# Patient Record
Sex: FEMALE | Employment: UNEMPLOYED | URBAN - METROPOLITAN AREA
[De-identification: names, ages, dates, MRNs, and addresses within clinical notes are randomized per-mention and may not be internally consistent; named-entity substitution may affect disease eponyms.]

---

## 2018-06-20 LAB — HIV 1+2 AB+HIV1 P24 AG SERPL QL IA: NON REACTIVE

## 2018-06-21 LAB
HBV SURFACE AG SERPL QL IA: NON REACTIVE
RUBELLA ABY IGG: NORMAL

## 2019-01-10 LAB — GROUP B STREP PCR: POSITIVE

## 2019-01-30 ENCOUNTER — HOSPITAL ENCOUNTER (OUTPATIENT)
Facility: CLINIC | Age: 34
Discharge: HOME | End: 2019-01-30
Attending: OBSTETRICS & GYNECOLOGY | Admitting: OBSTETRICS & GYNECOLOGY
Payer: COMMERCIAL

## 2019-01-30 VITALS
TEMPERATURE: 99.8 F | SYSTOLIC BLOOD PRESSURE: 111 MMHG | HEART RATE: 120 BPM | DIASTOLIC BLOOD PRESSURE: 75 MMHG | RESPIRATION RATE: 18 BRPM

## 2019-01-30 PROCEDURE — G0463 HOSPITAL OUTPT CLINIC VISIT: HCPCS | Mod: 25

## 2019-01-30 PROCEDURE — 59025 FETAL NON-STRESS TEST: CPT

## 2019-01-30 RX ORDER — ONDANSETRON 2 MG/ML
4 INJECTION INTRAMUSCULAR; INTRAVENOUS EVERY 6 HOURS PRN
Status: DISCONTINUED | OUTPATIENT
Start: 2019-01-30 | End: 2019-01-30 | Stop reason: HOSPADM

## 2019-01-30 RX ORDER — PRENATAL VIT/IRON FUM/FOLIC AC 27MG-0.8MG
1 TABLET ORAL DAILY
COMMUNITY

## 2019-01-30 SDOH — HEALTH STABILITY: MENTAL HEALTH: HOW OFTEN DO YOU HAVE A DRINK CONTAINING ALCOHOL?: NEVER

## 2019-01-31 NOTE — PLAN OF CARE
Data: Patient presented to [facility]: 2019  7:59 PM.  Reason for maternal/fetal assessment is uterine contractions. Patient reports I'm having Conts every 5-8 min aapart.  Patient is a .  Prenatal record reviewed. Pregnancy has been uncomplicated..  Gestational Age 38w6d. VSS. Fetal movement present. Patient denies leaking of vaginal fluid/rupture of membranes, vaginal bleeding, abdominal pain, pelvic pressure, nausea, vomiting, headache, visual disturbances, epigastric or URQ pain, significant edema. Support person is present.   Action: Verbal consent for EFM. Triage assessment completed. Bill of rights reviewed.  Response: Patient verbalized agreement with plan. Will contact Dr Merlyn Maher with update and further orders.

## 2019-01-31 NOTE — PROVIDER NOTIFICATION
01/30/19 2056   Provider Notification   Provider Name/Title DR Nika LARA   Method of Notification Phone   Request Evaluate - Remote   Notification Reason Patient Arrived;Uterine Activity;Patient Request;Status Update;SVE   Paged MD update re pt arrival,OB hx,VS temp 99.8, Pt denies any sign and symptoms of being sick or UTI.Conts are sporadic now; pt reported less intense compare at home,SVE done same as in clinic.FHT Category 1.Options discussed with pt to walk for an hour and re-evaluate.Pt requested to go home and said she will come back again once her conts are more stronger.MD gave discharge orders.POC d/w pt and spouse.They voiced understandings.

## 2019-01-31 NOTE — PLAN OF CARE
Data: Patient assessed in the [facility] for uterine contractions.  Cervical exam mid position, dilated to 1, thick, effaced 30-50% and soft.  Membranes intact.  Contractions sporadic.  Action:  Presumed adequate fetal oxygenation documented (see flow record). Discharge instructions reviewed.  Patient instructed to report change in fetal movement, vaginal leaking of fluid or bleeding, abdominal pain, or any concerns related to the pregnancy to her nurse/physician.    Response: Orders to discharge home per Merlyn Maher.  Patient verbalized understanding of education and verbalized agreement with plan. Discharged to home at 2110.

## 2019-01-31 NOTE — DISCHARGE INSTRUCTIONS
Discharge Instruction for Undelivered Patients      You were seen for: Labor Assessment and Fetal Assessment  We Consulted: DR Maher  You had (Test or Medicine):EFM NST,and SVE.     Diet:   Drink 8 to 12 glasses of liquids (milk, juice, water) every day.  You may eat meals and snacks.     Activity:  Call your doctor or nurse midwife if your baby is moving less than usual.     Call your provider if you notice:  Swelling in your face or increased swelling in your hands or legs.  Headaches that are not relieved by Tylenol (acetaminophen).  Changes in your vision (blurring: seeing spots or stars.)  Nausea (sick to your stomach) and vomiting (throwing up).   Weight gain of 5 pounds or more per week.  Heartburn that doesn't go away.  Signs of bladder infection: pain when you urinate (use the toilet), need to go more often and more urgently.  The bag of dean (rupture of membranes) breaks, or you notice leaking in your underwear.  Bright red blood in your underwear.  Abdominal (lower belly) or stomach pain.  Second (plus) baby: Contractions (tightening) gets more stronger.  Increase or change in vaginal discharge (note the color and amount)  Other: none    Follow-up:  As scheduled in the clinic

## 2019-02-01 ENCOUNTER — ANESTHESIA (OUTPATIENT)
Dept: OBGYN | Facility: CLINIC | Age: 34
End: 2019-02-01

## 2019-02-01 ENCOUNTER — HOSPITAL ENCOUNTER (INPATIENT)
Facility: CLINIC | Age: 34
LOS: 2 days | Discharge: HOME | End: 2019-02-03
Attending: OBSTETRICS & GYNECOLOGY | Admitting: OBSTETRICS & GYNECOLOGY
Payer: COMMERCIAL

## 2019-02-01 ENCOUNTER — ANESTHESIA EVENT (OUTPATIENT)
Dept: OBGYN | Facility: CLINIC | Age: 34
End: 2019-02-01

## 2019-02-01 PROBLEM — B95.1 POSITIVE GBS TEST: Status: ACTIVE | Noted: 2019-02-01

## 2019-02-01 PROBLEM — Z36.89 ENCOUNTER FOR TRIAGE IN PREGNANT PATIENT: Status: ACTIVE | Noted: 2019-02-01

## 2019-02-01 PROBLEM — O42.90 AMNIOTIC FLUID LEAKING: Status: ACTIVE | Noted: 2019-02-01

## 2019-02-01 PROBLEM — Z34.80 SUPERVISION OF OTHER NORMAL PREGNANCY, ANTEPARTUM: Status: ACTIVE | Noted: 2019-02-01

## 2019-02-01 LAB
ABO + RH BLD: NORMAL
ABO + RH BLD: NORMAL
ALBUMIN UR-MCNC: 30 MG/DL
APPEARANCE UR: ABNORMAL
BACTERIA #/AREA URNS HPF: ABNORMAL /HPF
BILIRUB UR QL STRIP: NEGATIVE
BLD GP AB SCN SERPL QL: NORMAL
BLOOD BANK CMNT PATIENT-IMP: NORMAL
COLOR UR AUTO: YELLOW
GLUCOSE UR STRIP-MCNC: NEGATIVE MG/DL
HGB BLD-MCNC: 11.3 G/DL (ref 11.7–15.7)
HGB UR QL STRIP: ABNORMAL
KETONES UR STRIP-MCNC: NEGATIVE MG/DL
LEUKOCYTE ESTERASE UR QL STRIP: ABNORMAL
MUCOUS THREADS #/AREA URNS LPF: PRESENT /LPF
NITRATE UR QL: NEGATIVE
PH UR STRIP: 7 PH (ref 5–7)
RBC #/AREA URNS AUTO: 32 /HPF (ref 0–2)
RUPTURE OF FETAL MEMBRANES BY ROM PLUS: POSITIVE
SOURCE: ABNORMAL
SP GR UR STRIP: 1.01 (ref 1–1.03)
SPECIMEN EXP DATE BLD: NORMAL
SQUAMOUS #/AREA URNS AUTO: 7 /HPF (ref 0–1)
T PALLIDUM AB SER QL: NONREACTIVE
UROBILINOGEN UR STRIP-MCNC: 0 MG/DL (ref 0–2)
WBC #/AREA URNS AUTO: >182 /HPF (ref 0–5)
WBC CLUMPS #/AREA URNS HPF: PRESENT /HPF

## 2019-02-01 PROCEDURE — 72200001 ZZH LABOR CARE VAGINAL DELIVERY SINGLE

## 2019-02-01 PROCEDURE — 81001 URINALYSIS AUTO W/SCOPE: CPT | Performed by: OBSTETRICS & GYNECOLOGY

## 2019-02-01 PROCEDURE — 25000132 ZZH RX MED GY IP 250 OP 250 PS 637: Performed by: OBSTETRICS & GYNECOLOGY

## 2019-02-01 PROCEDURE — 87086 URINE CULTURE/COLONY COUNT: CPT | Performed by: OBSTETRICS & GYNECOLOGY

## 2019-02-01 PROCEDURE — 25000128 H RX IP 250 OP 636: Performed by: OBSTETRICS & GYNECOLOGY

## 2019-02-01 PROCEDURE — 85018 HEMOGLOBIN: CPT | Performed by: OBSTETRICS & GYNECOLOGY

## 2019-02-01 PROCEDURE — 86901 BLOOD TYPING SEROLOGIC RH(D): CPT | Performed by: OBSTETRICS & GYNECOLOGY

## 2019-02-01 PROCEDURE — 36415 COLL VENOUS BLD VENIPUNCTURE: CPT | Performed by: OBSTETRICS & GYNECOLOGY

## 2019-02-01 PROCEDURE — G0463 HOSPITAL OUTPT CLINIC VISIT: HCPCS

## 2019-02-01 PROCEDURE — 86850 RBC ANTIBODY SCREEN: CPT | Performed by: OBSTETRICS & GYNECOLOGY

## 2019-02-01 PROCEDURE — 25000125 ZZHC RX 250: Performed by: OBSTETRICS & GYNECOLOGY

## 2019-02-01 PROCEDURE — 86780 TREPONEMA PALLIDUM: CPT | Performed by: OBSTETRICS & GYNECOLOGY

## 2019-02-01 PROCEDURE — 84112 EVAL AMNIOTIC FLUID PROTEIN: CPT | Performed by: OBSTETRICS & GYNECOLOGY

## 2019-02-01 PROCEDURE — 12000000 ZZH R&B MED SURG/OB

## 2019-02-01 PROCEDURE — 0KQM0ZZ REPAIR PERINEUM MUSCLE, OPEN APPROACH: ICD-10-PCS | Performed by: OBSTETRICS & GYNECOLOGY

## 2019-02-01 PROCEDURE — 86900 BLOOD TYPING SEROLOGIC ABO: CPT | Performed by: OBSTETRICS & GYNECOLOGY

## 2019-02-01 RX ORDER — AMOXICILLIN 250 MG
2 CAPSULE ORAL 2 TIMES DAILY
Status: DISCONTINUED | OUTPATIENT
Start: 2019-02-01 | End: 2019-02-03 | Stop reason: HOSPADM

## 2019-02-01 RX ORDER — IBUPROFEN 800 MG/1
800 TABLET, FILM COATED ORAL EVERY 6 HOURS PRN
Status: DISCONTINUED | OUTPATIENT
Start: 2019-02-01 | End: 2019-02-03 | Stop reason: HOSPADM

## 2019-02-01 RX ORDER — OXYTOCIN 10 [USP'U]/ML
10 INJECTION, SOLUTION INTRAMUSCULAR; INTRAVENOUS
Status: DISCONTINUED | OUTPATIENT
Start: 2019-02-01 | End: 2019-02-03 | Stop reason: HOSPADM

## 2019-02-01 RX ORDER — MISOPROSTOL 200 UG/1
400 TABLET ORAL ONCE
Status: DISCONTINUED | OUTPATIENT
Start: 2019-02-01 | End: 2019-02-01

## 2019-02-01 RX ORDER — HYDROCORTISONE 2.5 %
CREAM (GRAM) TOPICAL 3 TIMES DAILY PRN
Status: DISCONTINUED | OUTPATIENT
Start: 2019-02-01 | End: 2019-02-03 | Stop reason: HOSPADM

## 2019-02-01 RX ORDER — OXYCODONE AND ACETAMINOPHEN 5; 325 MG/1; MG/1
1 TABLET ORAL
Status: COMPLETED | OUTPATIENT
Start: 2019-02-01 | End: 2019-02-01

## 2019-02-01 RX ORDER — OXYTOCIN/0.9 % SODIUM CHLORIDE 30/500 ML
100 PLASTIC BAG, INJECTION (ML) INTRAVENOUS CONTINUOUS
Status: DISCONTINUED | OUTPATIENT
Start: 2019-02-01 | End: 2019-02-03 | Stop reason: HOSPADM

## 2019-02-01 RX ORDER — OXYCODONE AND ACETAMINOPHEN 5; 325 MG/1; MG/1
1 TABLET ORAL EVERY 4 HOURS PRN
Status: DISCONTINUED | OUTPATIENT
Start: 2019-02-01 | End: 2019-02-03 | Stop reason: HOSPADM

## 2019-02-01 RX ORDER — ACETAMINOPHEN 325 MG/1
650 TABLET ORAL EVERY 4 HOURS PRN
Status: DISCONTINUED | OUTPATIENT
Start: 2019-02-01 | End: 2019-02-03 | Stop reason: HOSPADM

## 2019-02-01 RX ORDER — OXYTOCIN/0.9 % SODIUM CHLORIDE 30/500 ML
340 PLASTIC BAG, INJECTION (ML) INTRAVENOUS CONTINUOUS PRN
Status: DISCONTINUED | OUTPATIENT
Start: 2019-02-01 | End: 2019-02-03 | Stop reason: HOSPADM

## 2019-02-01 RX ORDER — CARBOPROST TROMETHAMINE 250 UG/ML
250 INJECTION, SOLUTION INTRAMUSCULAR
Status: DISCONTINUED | OUTPATIENT
Start: 2019-02-01 | End: 2019-02-01

## 2019-02-01 RX ORDER — METHYLERGONOVINE MALEATE 0.2 MG/1
200 TABLET ORAL EVERY 6 HOURS SCHEDULED
Status: COMPLETED | OUTPATIENT
Start: 2019-02-01 | End: 2019-02-02

## 2019-02-01 RX ORDER — NALOXONE HYDROCHLORIDE 0.4 MG/ML
.1-.4 INJECTION, SOLUTION INTRAMUSCULAR; INTRAVENOUS; SUBCUTANEOUS
Status: DISCONTINUED | OUTPATIENT
Start: 2019-02-01 | End: 2019-02-03 | Stop reason: HOSPADM

## 2019-02-01 RX ORDER — SODIUM CHLORIDE, SODIUM LACTATE, POTASSIUM CHLORIDE, CALCIUM CHLORIDE 600; 310; 30; 20 MG/100ML; MG/100ML; MG/100ML; MG/100ML
INJECTION, SOLUTION INTRAVENOUS CONTINUOUS
Status: DISCONTINUED | OUTPATIENT
Start: 2019-02-01 | End: 2019-02-01

## 2019-02-01 RX ORDER — PENICILLIN G POTASSIUM 5000000 [IU]/1
5 INJECTION, POWDER, FOR SOLUTION INTRAMUSCULAR; INTRAVENOUS ONCE
Status: COMPLETED | OUTPATIENT
Start: 2019-02-01 | End: 2019-02-01

## 2019-02-01 RX ORDER — METHYLERGONOVINE MALEATE 0.2 MG/ML
200 INJECTION INTRAVENOUS
Status: DISCONTINUED | OUTPATIENT
Start: 2019-02-01 | End: 2019-02-01

## 2019-02-01 RX ORDER — CEPHALEXIN 500 MG/1
500 CAPSULE ORAL EVERY 12 HOURS SCHEDULED
Status: DISCONTINUED | OUTPATIENT
Start: 2019-02-01 | End: 2019-02-03

## 2019-02-01 RX ORDER — FENTANYL CITRATE 50 UG/ML
50-100 INJECTION, SOLUTION INTRAMUSCULAR; INTRAVENOUS
Status: DISCONTINUED | OUTPATIENT
Start: 2019-02-01 | End: 2019-02-01

## 2019-02-01 RX ORDER — NALOXONE HYDROCHLORIDE 0.4 MG/ML
.1-.4 INJECTION, SOLUTION INTRAMUSCULAR; INTRAVENOUS; SUBCUTANEOUS
Status: DISCONTINUED | OUTPATIENT
Start: 2019-02-01 | End: 2019-02-01

## 2019-02-01 RX ORDER — LANOLIN 100 %
OINTMENT (GRAM) TOPICAL
Status: DISCONTINUED | OUTPATIENT
Start: 2019-02-01 | End: 2019-02-03 | Stop reason: HOSPADM

## 2019-02-01 RX ORDER — OXYTOCIN/0.9 % SODIUM CHLORIDE 30/500 ML
100-340 PLASTIC BAG, INJECTION (ML) INTRAVENOUS CONTINUOUS PRN
Status: COMPLETED | OUTPATIENT
Start: 2019-02-01 | End: 2019-02-01

## 2019-02-01 RX ORDER — ACETAMINOPHEN 325 MG/1
650 TABLET ORAL EVERY 4 HOURS PRN
Status: DISCONTINUED | OUTPATIENT
Start: 2019-02-01 | End: 2019-02-01

## 2019-02-01 RX ORDER — ONDANSETRON 2 MG/ML
4 INJECTION INTRAMUSCULAR; INTRAVENOUS EVERY 6 HOURS PRN
Status: DISCONTINUED | OUTPATIENT
Start: 2019-02-01 | End: 2019-02-01

## 2019-02-01 RX ORDER — AMOXICILLIN 250 MG
1 CAPSULE ORAL 2 TIMES DAILY
Status: DISCONTINUED | OUTPATIENT
Start: 2019-02-01 | End: 2019-02-03 | Stop reason: HOSPADM

## 2019-02-01 RX ORDER — IBUPROFEN 800 MG/1
800 TABLET, FILM COATED ORAL
Status: COMPLETED | OUTPATIENT
Start: 2019-02-01 | End: 2019-02-01

## 2019-02-01 RX ORDER — OXYTOCIN 10 [USP'U]/ML
10 INJECTION, SOLUTION INTRAMUSCULAR; INTRAVENOUS
Status: DISCONTINUED | OUTPATIENT
Start: 2019-02-01 | End: 2019-02-01

## 2019-02-01 RX ORDER — BISACODYL 10 MG
10 SUPPOSITORY, RECTAL RECTAL DAILY PRN
Status: DISCONTINUED | OUTPATIENT
Start: 2019-02-03 | End: 2019-02-03 | Stop reason: HOSPADM

## 2019-02-01 RX ADMIN — IBUPROFEN 800 MG: 800 TABLET ORAL at 20:25

## 2019-02-01 RX ADMIN — CEPHALEXIN 500 MG: 500 CAPSULE ORAL at 20:25

## 2019-02-01 RX ADMIN — SENNOSIDES AND DOCUSATE SODIUM 1 TABLET: 8.6; 5 TABLET ORAL at 20:25

## 2019-02-01 RX ADMIN — SODIUM CHLORIDE, POTASSIUM CHLORIDE, SODIUM LACTATE AND CALCIUM CHLORIDE: 600; 310; 30; 20 INJECTION, SOLUTION INTRAVENOUS at 03:05

## 2019-02-01 RX ADMIN — LIDOCAINE HYDROCHLORIDE 20 ML: 10 INJECTION, SOLUTION EPIDURAL; INFILTRATION; INTRACAUDAL; PERINEURAL at 05:20

## 2019-02-01 RX ADMIN — OXYTOCIN-SODIUM CHLORIDE 0.9% IV SOLN 30 UNIT/500ML 340 ML/HR: 30-0.9/5 SOLUTION at 05:20

## 2019-02-01 RX ADMIN — ACETAMINOPHEN 650 MG: 325 TABLET, FILM COATED ORAL at 16:27

## 2019-02-01 RX ADMIN — IBUPROFEN 800 MG: 800 TABLET ORAL at 14:00

## 2019-02-01 RX ADMIN — IBUPROFEN 800 MG: 800 TABLET, FILM COATED ORAL at 05:40

## 2019-02-01 RX ADMIN — CEPHALEXIN 500 MG: 500 CAPSULE ORAL at 08:43

## 2019-02-01 RX ADMIN — SENNOSIDES AND DOCUSATE SODIUM 1 TABLET: 8.6; 5 TABLET ORAL at 08:43

## 2019-02-01 RX ADMIN — PENICILLIN G POTASSIUM 5 MILLION UNITS: 5000000 POWDER, FOR SOLUTION INTRAMUSCULAR; INTRAPLEURAL; INTRATHECAL; INTRAVENOUS at 03:08

## 2019-02-01 RX ADMIN — METHYLERGONOVINE MALEATE 200 MCG: 0.2 TABLET ORAL at 17:56

## 2019-02-01 RX ADMIN — METHYLERGONOVINE MALEATE 200 MCG: 0.2 TABLET ORAL at 11:28

## 2019-02-01 RX ADMIN — OXYCODONE HYDROCHLORIDE AND ACETAMINOPHEN 1 TABLET: 5; 325 TABLET ORAL at 05:40

## 2019-02-01 NOTE — H&P
[facility]    History and Physical  Obstetrics and Gynecology     Date of Admission:  2019    CHIEF COMPLAINT:  Leaking amniotic fluid    HISTORY OF PRESENT ILLNESS:    (Please see scanned  sheets for prenatal history. Examination at the time of admission revealed no interval change in the patient s history or physical exam except as described below.)    Pt is a 32 yo  at 39 weeks 1 day who presents with spontaneous rupture of membranes.  This pregnancy has been uncomplicated however she is GBS+.  She established care at 7 weeks 1 day.  She declined genetic testing.  She had a normal 20 week scan.  Her 1 hr GTT was normal at 95.  She developed mild anemia and was started on iron.  She is GBS positive.  She is healthy.    OBSTETRICAL / DATING HISTORY:  No LMP recorded. Patient is pregnant.  Estimated Date of Delivery: 2019  Gestational Age: 39w1d    Obstetric History         Obstetric History       T2      L2     SAB0   TAB0   Ectopic0   Multiple0   Live Births2       # Outcome Date GA Lbr Richard/2nd Weight Sex Delivery Anes PTL Lv   3 Current            2 Term 01/08/15 39w6d  3.68 kg (8 lb 1.8 oz) M Vag-Spont None N MARILEE   1 Term 13 38w4d  3.062 kg (6 lb 12 oz) M Vag-Spont None N MARILEE            LABS:  Recent Labs   Lab Test 19  0350   ABO O   RH Pos   AS Neg     Rhogam not indicated   Recent Labs   Lab Test 01/10/19 06/21/18 06/20/18   HEPBANG  --  Non reactive  --    HIAGAB  --   --  Non reactive   GBS Positive  --   --          PAST MEDICAL HISTORY:  Past Medical History:   Diagnosis Date     Anemia in pregnancy      Migraine        PAST SURGICAL HISTORY:  Past Surgical History:   Procedure Laterality Date     NO HISTORY OF SURGERY         FAMILY HISTORY:  Family History   Problem Relation Age of Onset     No Known Problems Mother      No Known Problems Father      No Known Problems Sister      No Known Problems Brother          SOCIAL  HISTORY:  Social History     Socioeconomic History     Marital status:      Spouse name: None     Number of children: None     Years of education: None     Highest education level: None   Social Needs     Financial resource strain: None     Food insecurity - worry: None     Food insecurity - inability: None     Transportation needs - medical: None     Transportation needs - non-medical: None   Occupational History     Occupation: Homemaker   Tobacco Use     Smoking status: Never Smoker     Smokeless tobacco: Never Used   Substance and Sexual Activity     Alcohol use: No     Frequency: Never     Drug use: No     Sexual activity: Yes     Partners: Male   Other Topics Concern     None   Social History Narrative     None       MEDICATIONS:  Prenatal vitamins  Iron    ALLERGIES:   No Known Allergies               REVIEW OF SYSTEMS:  ROS - leaking of fluid, good fetal movement, painful contractions      PHYSICAL EXAM:   /61   Temp 97.8  F (36.6  C) (Oral)   Resp 16   Wt 68.9 kg (152 lb)   General appearance: Pt is alert and oriented x 3.    Abdomen: gravid, single vertex fetus, non-tender, EFW 7 lbs 6 oz  Cervical Exam: 5/ 80/ Anterior/ soft/ -1 per RN last exam    Fetal Heart Tones: 130 baseline, moderate variablility, + accels, no decels and Category I  TOCO:   frequency q 2-3 minutes    ASSESSMENT:  33 year old  at 39w1d - SROM in labor    PLAN:  Admit to Family [facility]  FHT category I  GBS positive - antibiotics ordered  Pain management options discussed  Routine labor care      Angela Carey MD  Ob/Gyn

## 2019-02-01 NOTE — L&D DELIVERY NOTE
OB Vaginal Delivery Note    Rosy Carson MRN# [id number]   Age: 33 year old YOB: 1985       GA: 39w1d  GP:   Labor Complications: None;GBS   EBL: 400  mL  QBL: 400 mL  Delivery Type: Vaginal, Spontaneous   ROM to Delivery Time: 4h 08m   Weight: 3.38 kg (7 lb 7.2 oz)    1 Minute 5 Minute 10 Minute   Apgar Totals: 8    9                Delivery Details:  Rosy Carson, a 33 year old  female at 39 weeks 1 day who presented to labor and delivery with spontaneous rupture of membranes and labor.  IV antibiotics were given due to GBS + status.  She progressed normally in labor and delivered a viable male infant at 05:08 with apgars of 8   and 9  . Nuchal x 1 was easily reduced.  The baby was placed on the maternal abdomen following delivery where the umbilical cord was clamped and cut.  The placenta delivered spontaneously intact with a 3 vessel cord.  A second degree perineal laceration was noted.  This was injected with lidocaine and repaired with 3-0 vicryl in the standard fashion.   She tolerated the delivery and repair well.      Cord complications: nuchal   Placenta delivered at 2019  5:12 AM . Placental disposition was Hospital disposal . Fundal massage performed and fundus found to be firm.     Episiotomy: none    Perineum, vagina, cervix were inspected, and the following lacerations were noted:   Perineal lacerations: 2nd                     Any lacerations were repaired in the usual fashion using 3-0 vicryl.    Excellent hemostasis was noted. Needle count correct. Infant and patient in delivery room in good and stable condition.        Labor Event Times    Labor onset date:  19 Onset time:   1:00 AM   Dilation complete date:  19 Complete time:   5:03 AM   Start pushing date/time:  2019 0505      Labor Events    Labor Type:  Spontaneous  Predominate monitoring during 1st stage:  continuous electronic fetal monitoring     Antibiotics received during labor?:   "Yes  Reason for Antibiotics:  GBS  Antibiotics received for GBS:  Penicillin     Rupture date/time: 19 0100   Rupture type:  Spontaneous rupture of membranes occuring during spontaneous labor or augmentation     1:1 continuous labor support provided by?:  RN       Delivery/Placenta Date and Time    Delivery Date:  19 Delivery Time:   5:08 AM   Placenta Date/Time:  2019  5:12 AM  Oxytocin given at the time of delivery:  after delivery of baby     Vaginal Counts     Initial count performed by 2 team members:   Two Team Members   Dr. Aurelio GIFFORD RN       [employer]   Initial counts 2  5    Added to count  1     Final counts 2 1 5    Placed during labor Accounted for at the end of labor   No NA   No NA   No NA    Final count performed by 2 team members:   Two Team Members   Dr. Aurelio GIFFORD RN      Final count correct?:  Yes     Apgars    Living status:  Living   1 Minute 5 Minute 10 Minute 15 Minute 20 Minute   Skin color: 0  1       Heart rate: 2  2       Reflex irritability: 2  2       Muscle tone: 2  2       Respiratory effort: 2  2       Total: 8  9       Apgars assigned by:  JENNIFER MARTINEZ     Cord    Vessels:  3 Vessels Complications:  Nuchal   Cord Blood Disposition:  Lab Gases Sent?:  Yes      [place] Resuscitation    Methods:  None      Measurements    Weight:  7 lb 7.2 oz Length:  1' 9\"   Head circumference:  13.5 cm       Skin to Skin and Feeding Plan    Skin to skin initiation date/time: 1841    Skin to skin with:  Mother  Skin to skin end date/time:     How do you plan to feed your baby:  Breastfeeding, Formula     Labor Events and Shoulder Dystocia    Fetal Tracing Prior to Delivery:  Category 1  Shoulder dystocia present?:  Neg     Delivery (Maternal) (Provider to Complete) ([id number])    Episiotomy:  None  Perineal lacerations:  2nd    Vaginal laceration?:  No    Cervical laceration?:  No    Est. blood loss (mL):  400     Blood Loss  Mother: " Rosy Carson #[id number]   Start of Mother's Information    IO Blood Loss  02/01/19 0100 - 02/01/19 0540    EBL (mL) Hospital Encounter 400 mL    Total QBL Blood Loss (mL) Hospital Encounter 400 mL    Total  800 mL         End of Mother's Information  Mother: Rosy Carson #[id number]         Delivery - Provider to Complete ([id number])    Delivering clinician:  Angela Carey MD  Attempted Delivery Types (Choose all that apply):  Spontaneous Vaginal Delivery  Delivery Type (Choose the 1 that will go to the Birth History):  Vaginal, Spontaneous          Placenta    Delayed Cord Clamping:  Done  Date/Time:  2/1/2019  5:12 AM  Removal:  Spontaneous  Comments:  Nuchal x 1  Disposition:  Hospital disposal     Anesthesia    Method:  None          Presentation and Position    Presentation:  Vertex  Position:  Right Occiput Anterior           Angela Carey MD

## 2019-02-01 NOTE — PLAN OF CARE
Dr. Guevara saw patient at 1020 to evaluate bleeding and examine for clots. 1 small clot found by internal exam by MD, orders for PO methergine received with discharge of cytotec. Fundal exam at 1120 firm with scant flow with scant amount of blood on pad.

## 2019-02-01 NOTE — PLAN OF CARE
Data: Vital signs within normal limits. Postpartum checks within normal limits - see flow record. Patient eating and drinking normally. Patient able to empty bladder independently and is up ambulating. No apparent signs of infection. Laceration  healing well. Patient performing self cares and is able to care for infant.  Action: Patient medicated during the shift for pain. See MAR. Patient reassessed within 1 hour after each medication and pain was improved - patient stated she was comfortable. Patient education done about medications, bleeding, breast/bottle feeding and orientated to room and  clipboard. See flow record.  Response: Positive attachment behaviors observed with infant. Support persons is present.   Plan: Anticipate discharge on 19.

## 2019-02-01 NOTE — PROVIDER NOTIFICATION
02/01/19 1000   Provider Notification   Provider Name/Title Dr. Guevara   Method of Notification Phone   Request Evaluate-Remote   Notification Reason Other   Dr. Guevara called and updated regarding increased flow, absence of clots, and firm uterus without massage. New order's for PO cytotec received. Will update MD with bleeding.

## 2019-02-01 NOTE — PROVIDER NOTIFICATION
02/01/19 0435   Provider Notification   Provider Name/Title Dr. Carey   Method of Notification Phone   Request Evaluate - Remote   Notification Reason SVE;Uterine Activity   Patient feeling pressure in bottom. 5/85/-1. Bulging forebag noted. Dr. Carey enroute to [facility].

## 2019-02-01 NOTE — PROGRESS NOTES
Called by nurse to see pt with increased lochia rubra, more than usual or expected.  Pt doing well, nursing.  Passed small clot x 1 when she got up.    Had delivery at 0508 this morning, placenta described as intact, .  Adm hgb 11.3.    Blood pressure 111/72, temperature 97.8  F (36.6  C), temperature source Oral, resp. rate 16, weight 68.9 kg (152 lb).    WDWN [place] woman, nursing   FF at U or U-1, parker to right, very firm.  Perineal laceration repair intact.  Gentle bimanual exam without significant clot, cervix feels intact by palpation.  I did not get a lot of flow/clot out with exam.  Her paeripad is soaked--will weigh.    ASSESSMENT:  Excessive pp lochia so far.    PLAN:  Will add methergine x 3 doses, continue to monitor.  Repair looks intact and hemostatic.  Fundus is firm.    Merlyn Maher MD on 2019 at 10:37 AM

## 2019-02-01 NOTE — PLAN OF CARE
"Data: Patient presented to [facility]: 2019  1:28 AM.  Reason for maternal/fetal assessment is leaking vaginal fluid. Patient reports \"milky white\" gush of fluid at 0100 with contractions every 10 minutes.  Patient is a .  Prenatal record reviewed. Pregnancy  has been complicated by has been uncomplicated.  Gestational Age 39w1d. VSS. Fetal movement present. Patient denies vaginal bleeding, abdominal pain, and nausea/vomiting . Support person is present.   Action: Verbal consent for EFM. Triage assessment completed. Bill of rights reviewed.  Response: Patient verbalized agreement with plan. Will contact Dr Angela Carey with update and further orders.      "

## 2019-02-01 NOTE — PROVIDER NOTIFICATION
19 0225   Provider Notification   Provider Name/Title Dr. Carey   Method of Notification Phone   Request Evaluate - Remote   Notification Reason SVE;Membrane Status;Uterine Activity;Patient Arrived    here for r/o rupture of membranes. States water broke at 0100. Rom+ positive. SVE 3/50/-2. Rachel every 6 minutes, palpate mild. GBS +. Intrapartum orders received. Will  Update MD  after 4 hours of initial penicillin treatment. Patient is not planning epidural. POC discussed with patient and support person.

## 2019-02-02 LAB
BACTERIA SPEC CULT: NORMAL
BACTERIA SPEC CULT: NORMAL
HGB BLD-MCNC: 10.4 G/DL (ref 11.7–15.7)
Lab: NORMAL
SPECIMEN SOURCE: NORMAL

## 2019-02-02 PROCEDURE — 85018 HEMOGLOBIN: CPT | Performed by: OBSTETRICS & GYNECOLOGY

## 2019-02-02 PROCEDURE — 36415 COLL VENOUS BLD VENIPUNCTURE: CPT | Performed by: OBSTETRICS & GYNECOLOGY

## 2019-02-02 PROCEDURE — 25000132 ZZH RX MED GY IP 250 OP 250 PS 637: Performed by: OBSTETRICS & GYNECOLOGY

## 2019-02-02 PROCEDURE — 12000000 ZZH R&B MED SURG/OB

## 2019-02-02 PROCEDURE — 40000083 ZZH STATISTIC IP LACTATION SERVICES 1-15 MIN

## 2019-02-02 RX ORDER — IBUPROFEN 800 MG/1
800 TABLET, FILM COATED ORAL EVERY 6 HOURS PRN
Qty: 40 TABLET | Refills: 1 | Status: SHIPPED | OUTPATIENT
Start: 2019-02-02 | End: 2019-02-03

## 2019-02-02 RX ORDER — CEPHALEXIN 500 MG/1
500 CAPSULE ORAL EVERY 12 HOURS
Qty: 20 CAPSULE | Refills: 0 | Status: SHIPPED | OUTPATIENT
Start: 2019-02-02 | End: 2019-02-03

## 2019-02-02 RX ORDER — AMOXICILLIN 250 MG
1 CAPSULE ORAL 2 TIMES DAILY PRN
Qty: 20 TABLET | Refills: 1 | Status: SHIPPED | OUTPATIENT
Start: 2019-02-02 | End: 2019-02-03

## 2019-02-02 RX ADMIN — CEPHALEXIN 500 MG: 500 CAPSULE ORAL at 20:07

## 2019-02-02 RX ADMIN — SENNOSIDES AND DOCUSATE SODIUM 1 TABLET: 8.6; 5 TABLET ORAL at 08:59

## 2019-02-02 RX ADMIN — METHYLERGONOVINE MALEATE 200 MCG: 0.2 TABLET ORAL at 06:03

## 2019-02-02 RX ADMIN — IBUPROFEN 800 MG: 800 TABLET ORAL at 20:07

## 2019-02-02 RX ADMIN — METHYLERGONOVINE MALEATE 200 MCG: 0.2 TABLET ORAL at 00:22

## 2019-02-02 RX ADMIN — CEPHALEXIN 500 MG: 500 CAPSULE ORAL at 08:59

## 2019-02-02 RX ADMIN — IBUPROFEN 800 MG: 800 TABLET ORAL at 13:55

## 2019-02-02 RX ADMIN — IBUPROFEN 800 MG: 800 TABLET ORAL at 02:54

## 2019-02-02 NOTE — PLAN OF CARE
Pt doing well this shift. VSS. Bonding well with . Independent with breastfeeding. Pain is well controlled with Tylenol and Ibuprofen. Uterus firm without massage, in midline, bleeding stable. Pt is voiding without difficulty and ambulating independently. Independent in self cares. Meeting expected goals. Continue to monitor.

## 2019-02-02 NOTE — PROGRESS NOTES
[facility]   Post-partum Note    Name:  Rosy Carson  MRN: [id number]    S: Patient states is doing well  This Am.  Pain is adequately controlled.  Tolerating regular diet without nausea or vomiting. Voiding spontaneously, passing flatus and had a bowel movement as of yet.   Ambulating without dizziness today.  Lochia is similar to menses.  Breast and bottle feeding. Patient is undecided about  Contraception,states no one ever talked to her  About it before.  Plans discharge tomorrow.    O:   Patient Vitals for the past 24 hrs:   BP Temp Temp src Pulse Resp   19 0024 101/51 98.3  F (36.8  C) Oral 86 16   19 1627 113/69 99  F (37.2  C) Oral 83 18   19 1348 115/69 -- -- 85 18   19 1125 109/69 -- -- 81 --   19 1010 111/71 98.2  F (36.8  C) Oral 79 16     Gen:  Resting comfortably, NAD  CV:  Regular rate  Pulm:  Non-labored breathing.  No cough or wheezing.   Abd:  Soft, appropriately tender to palpation, non-distended.  Fundus at -1 umbilicus, firm and non-tender.  Ext:  Non-tender, trace LE edema b/l    Assessment/Plan:  33 year old  on PPD #1 s/p .  Continue with routine postpartum management.     Urinary urgency, presumed  UTI:   - UCx pending, per Lab should be back tomorrow  - PPX started on Keflex  - Afebrile     Pain: Well-controlled with ibuprofen and tylenol  Hgb: 11.3>>AM Hgb pending.  Discharge with oral iron if <10.0.  VSS as noted above, asymptomatic. Patient did receive 24 hours of methergine for increased lochia flow.   GI:  BID Senna/Colace.  PRN Simethicone.   PPx:  Encouraged ambulation   Rh: Positive  Rubella: Immune  Feed: Breast and bottle  BC: Undecided, declines information   Dispo: Plan for home on PPD#2.     Michelle Pena MD   Pager: [phone]   2019

## 2019-02-02 NOTE — PLAN OF CARE
Pt. VSS. Pain managed with PRN ibuprofen. Independent in infant and personal cares. Breastfeeding infant. Attentive to infant needs and bonding well with infant.

## 2019-02-02 NOTE — PLAN OF CARE
Patient is stable, actively working on breastfeeding, gaining strength and independence with self and baby cares.

## 2019-02-03 VITALS
HEART RATE: 87 BPM | TEMPERATURE: 98.2 F | DIASTOLIC BLOOD PRESSURE: 67 MMHG | RESPIRATION RATE: 18 BRPM | WEIGHT: 152 LBS | SYSTOLIC BLOOD PRESSURE: 109 MMHG

## 2019-02-03 PROCEDURE — 25000132 ZZH RX MED GY IP 250 OP 250 PS 637: Performed by: OBSTETRICS & GYNECOLOGY

## 2019-02-03 RX ORDER — IBUPROFEN 600 MG/1
600 TABLET, FILM COATED ORAL EVERY 6 HOURS PRN
Qty: 90 TABLET | Refills: 1 | Status: SHIPPED | OUTPATIENT
Start: 2019-02-03 | End: 2019-02-03

## 2019-02-03 RX ORDER — AMOXICILLIN 250 MG
1 CAPSULE ORAL 2 TIMES DAILY PRN
Qty: 60 TABLET | Refills: 1 | Status: SHIPPED | OUTPATIENT
Start: 2019-02-03 | End: 2019-03-05

## 2019-02-03 RX ORDER — IBUPROFEN 800 MG/1
800 TABLET, FILM COATED ORAL EVERY 6 HOURS PRN
COMMUNITY
Start: 2019-02-03

## 2019-02-03 RX ADMIN — SENNOSIDES AND DOCUSATE SODIUM 1 TABLET: 8.6; 5 TABLET ORAL at 09:10

## 2019-02-03 RX ADMIN — IBUPROFEN 800 MG: 800 TABLET ORAL at 09:10

## 2019-02-03 NOTE — PROGRESS NOTES
PPD #2+    No complaints, doing well.  Ready for discharge today. Urine culture is negative, so no longer needs keflex.  Does not think she has excess bleeding.     OBJECTIVE:  Blood pressure 116/72, pulse 78, temperature 98.6  F (37  C), temperature source Oral, resp. rate 16, weight 68.9 kg (152 lb), unknown if currently breastfeeding.    WDWN woman in NAD.  FF U -1    Hemoglobin   Date Value Ref Range Status   2019 10.4 (L) 11.7 - 15.7 g/dL Final   2019 11.3 (L) 11.7 - 15.7 g/dL Final       ASSESSMENT:  S/P  doing well  PPD #2  Principal Problem:    Amniotic fluid leaking (2019)  Active Problems:    Encounter for triage in pregnant patient (2019)    Indication for care in labor or delivery (2019)    Supervision of other normal pregnancy, antepartum (2019)    Positive GBS test (2019)      PLAN:  OK to discharge home today.  Post partum restrictions and what to expect in puerperium reviewed. Rx Ibuprofen, colace.  RTC 6 weeks for post partum cares, pelvic rest until then.    Merlyn Maher MD February 3, 2019

## 2019-02-03 NOTE — LACTATION NOTE
Lactation in to see patient. Patient states she breast fed her other babies well, and felt like it only took a day for her milk to come in. This baby she says is hungry and is choosing to breastfeed and then supplement with formula via bottle afterwards. Discussed trying to breastfeed first for stimulation. Encouraged patient to call for assistance prn.

## 2019-02-03 NOTE — PLAN OF CARE
Pt stable and meeting expected goals. VSS. Up ad herman and independent with cares. Breastfeeding is going well; baby was in the nursery for half the shift and formula fed. Bonding with mother. Continue to monitor.

## 2019-02-03 NOTE — PLAN OF CARE
VSS. Pain is well controlled with Ibuprofen. Pt has been independent with self and  cares. Voiding without difficulty. Tolerating regular diet well. Bonding well with . Anticipate discharge on 2/3/2019.

## 2019-02-03 NOTE — PLAN OF CARE
Patient meeting expected goals. Is up independent in room, meeting all personal and infant needs. VSS. Pain is being managed with Ibuprofen. Breastfeeding is going well. Bonding well with infant. Patient is stable and will be ready for discharge later today. Patient is aware to follow up in 6 weeks for postpartum visit. Discharge meds issued to patient; Ibuprofen and Senna. MD discontinued Keflex today.

## 2019-02-03 NOTE — PROGRESS NOTES
Writer reviewed all discharge paperwork with both patient and spouse and all questions answered. Patient left unit with all belongings and discharge meds and infant at 1250.

## 2019-02-03 NOTE — DISCHARGE INSTRUCTIONS
"Rosy-    Congratulations on your new baby!    A few instructions:    -No tampons/douching/intercourse x 6 weeks.  See your health care provider first, to be sure you are healed.  -If you have stitches, it may help you feel better sooner ifyou can sit in the tub twice daily for a few minutes, for the first week after delivery.  Keep the area clean and dry.  -Call your clinic if fever, worsening pain, or any questions.    Medications:  Ibuprofen 800 mg every 6 hours will be helpful for pain and cramping.  You may want to use a stool softener if needed, available over the counter, like Colace 100 mg twice daily or Senna twice daily.    Post partum depression:   10-15% of women will have more than the baby blues--if you are feeling very sad or down, having regrets, can't stop thinking about certain thoughts or actions, you could be experiencing post partum depression.  Please call your health care provider to discuss.    Follow up:  Please call your clinic soon to schedule a \"post partum visit\" for 6 weeks from now.   If you need anything by prescription for contraception, they will go over it with you at that visit.    Again, congratulations!       Merlyn Maher MD February 3, 2019     Postpartum Vaginal Delivery Instructions    Activity       Ask family and friends for help when you need it.    Do not place anything in your vagina for 6 weeks.    You are not restricted on other activities, but take it easy for a few weeks to allow your body to recover from delivery.  You are able to do any activities you feel up to that point.    No driving until you have stopped taking your pain medications (usually two weeks after delivery).     Call your health care provider if you have any of these symptoms:       Increased pain, swelling, redness, or fluid around your stiches from an episiotomy or perineal tear.    A fever above 100.4 F (38 C) with or without chills when placing a thermometer under your tongue.    You soak " a sanitary pad with blood within 1 hour, or you see blood clots larger than a golf ball.    Bleeding that lasts more than 6 weeks.    Vaginal discharge that smells bad.    Severe pain, cramping or tenderness in your lower belly area.    A need to urinate more frequently (use the toilet more often), more urgently (use the toilet very quickly), or it burns when you urinate.    Nausea and vomiting.    Redness, swelling or pain around a vein in your leg.    Problems breastfeeding or a red or painful area on your breast.    Chest pain and cough or are gasping for air.    Problems coping with sadness, anxiety, or depression.  If you have any concerns about hurting yourself or the baby, call your provider immediately.     You have questions or concerns after you return home.     Keep your hands clean:  Always wash your hands before touching your perineal area and stitches.  This helps reduce your risk of infection.  If your hands aren't dirty, you may use an alcohol hand-rub to clean your hands. Keep your nails clean and short.

## 2022-12-30 ENCOUNTER — HOSPITAL ENCOUNTER (EMERGENCY)
Facility: CLINIC | Age: 37
Discharge: HOME | End: 2022-12-30
Attending: EMERGENCY MEDICINE | Admitting: EMERGENCY MEDICINE
Payer: COMMERCIAL

## 2022-12-30 VITALS
DIASTOLIC BLOOD PRESSURE: 76 MMHG | OXYGEN SATURATION: 100 % | SYSTOLIC BLOOD PRESSURE: 112 MMHG | RESPIRATION RATE: 18 BRPM | TEMPERATURE: 97.9 F | WEIGHT: 140 LBS | HEART RATE: 75 BPM

## 2022-12-30 DIAGNOSIS — S01.81XA FACIAL LACERATION, INITIAL ENCOUNTER: ICD-10-CM

## 2022-12-30 DIAGNOSIS — S00.83XA FACIAL CONTUSION, INITIAL ENCOUNTER: ICD-10-CM

## 2022-12-30 DIAGNOSIS — D64.9 ANEMIA, UNSPECIFIED TYPE: ICD-10-CM

## 2022-12-30 DIAGNOSIS — R55 SYNCOPE, UNSPECIFIED SYNCOPE TYPE: ICD-10-CM

## 2022-12-30 DIAGNOSIS — R10.13 ABDOMINAL PAIN, EPIGASTRIC: ICD-10-CM

## 2022-12-30 LAB
ALBUMIN SERPL BCG-MCNC: 4.3 G/DL (ref 3.5–5.2)
ALP SERPL-CCNC: 84 U/L (ref 35–104)
ALT SERPL W P-5'-P-CCNC: 9 U/L (ref 10–35)
ANION GAP SERPL CALCULATED.3IONS-SCNC: 12 MMOL/L (ref 7–15)
AST SERPL W P-5'-P-CCNC: 16 U/L (ref 10–35)
BASOPHILS # BLD AUTO: 0 10E3/UL (ref 0–0.2)
BASOPHILS NFR BLD AUTO: 1 %
BILIRUB SERPL-MCNC: 0.2 MG/DL
BUN SERPL-MCNC: 13.4 MG/DL (ref 6–20)
CALCIUM SERPL-MCNC: 9.1 MG/DL (ref 8.6–10)
CHLORIDE SERPL-SCNC: 104 MMOL/L (ref 98–107)
CREAT SERPL-MCNC: 0.47 MG/DL (ref 0.51–0.95)
DEPRECATED HCO3 PLAS-SCNC: 23 MMOL/L (ref 22–29)
EOSINOPHIL # BLD AUTO: 0.1 10E3/UL (ref 0–0.7)
EOSINOPHIL NFR BLD AUTO: 2 %
ERYTHROCYTE [DISTWIDTH] IN BLOOD BY AUTOMATED COUNT: 17.4 % (ref 10–15)
GFR SERPL CREATININE-BSD FRML MDRD: >90 ML/MIN/1.73M2
GLUCOSE SERPL-MCNC: 95 MG/DL (ref 70–99)
HCG SERPL QL: NEGATIVE
HCT VFR BLD AUTO: 35.8 % (ref 35–47)
HGB BLD-MCNC: 10.9 G/DL (ref 11.7–15.7)
IMM GRANULOCYTES # BLD: 0 10E3/UL
IMM GRANULOCYTES NFR BLD: 0 %
LIPASE SERPL-CCNC: 25 U/L (ref 13–60)
LYMPHOCYTES # BLD AUTO: 1.4 10E3/UL (ref 0.8–5.3)
LYMPHOCYTES NFR BLD AUTO: 29 %
MCH RBC QN AUTO: 23.2 PG (ref 26.5–33)
MCHC RBC AUTO-ENTMCNC: 30.4 G/DL (ref 31.5–36.5)
MCV RBC AUTO: 76 FL (ref 78–100)
MONOCYTES # BLD AUTO: 0.4 10E3/UL (ref 0–1.3)
MONOCYTES NFR BLD AUTO: 8 %
NEUTROPHILS # BLD AUTO: 3 10E3/UL (ref 1.6–8.3)
NEUTROPHILS NFR BLD AUTO: 60 %
NRBC # BLD AUTO: 0 10E3/UL
NRBC BLD AUTO-RTO: 0 /100
PLATELET # BLD AUTO: 298 10E3/UL (ref 150–450)
POTASSIUM SERPL-SCNC: 3.6 MMOL/L (ref 3.4–5.3)
PROT SERPL-MCNC: 7.9 G/DL (ref 6.4–8.3)
RBC # BLD AUTO: 4.69 10E6/UL (ref 3.8–5.2)
SODIUM SERPL-SCNC: 139 MMOL/L (ref 136–145)
TROPONIN T SERPL HS-MCNC: <6 NG/L
WBC # BLD AUTO: 5 10E3/UL (ref 4–11)

## 2022-12-30 PROCEDURE — 93005 ELECTROCARDIOGRAM TRACING: CPT

## 2022-12-30 PROCEDURE — 258N000003 HC RX IP 258 OP 636: Performed by: EMERGENCY MEDICINE

## 2022-12-30 PROCEDURE — 84484 ASSAY OF TROPONIN QUANT: CPT | Performed by: EMERGENCY MEDICINE

## 2022-12-30 PROCEDURE — 83690 ASSAY OF LIPASE: CPT | Performed by: EMERGENCY MEDICINE

## 2022-12-30 PROCEDURE — 99284 EMERGENCY DEPT VISIT MOD MDM: CPT

## 2022-12-30 PROCEDURE — 250N000013 HC RX MED GY IP 250 OP 250 PS 637: Performed by: EMERGENCY MEDICINE

## 2022-12-30 PROCEDURE — 96360 HYDRATION IV INFUSION INIT: CPT

## 2022-12-30 PROCEDURE — 85025 COMPLETE CBC W/AUTO DIFF WBC: CPT | Performed by: EMERGENCY MEDICINE

## 2022-12-30 PROCEDURE — 36415 COLL VENOUS BLD VENIPUNCTURE: CPT | Performed by: EMERGENCY MEDICINE

## 2022-12-30 PROCEDURE — 80053 COMPREHEN METABOLIC PANEL: CPT | Performed by: EMERGENCY MEDICINE

## 2022-12-30 PROCEDURE — 84703 CHORIONIC GONADOTROPIN ASSAY: CPT | Performed by: EMERGENCY MEDICINE

## 2022-12-30 RX ORDER — SODIUM CHLORIDE 9 MG/ML
INJECTION, SOLUTION INTRAVENOUS CONTINUOUS
Status: DISCONTINUED | OUTPATIENT
Start: 2022-12-30 | End: 2022-12-30 | Stop reason: HOSPADM

## 2022-12-30 RX ORDER — MAGNESIUM HYDROXIDE/ALUMINUM HYDROXICE/SIMETHICONE 120; 1200; 1200 MG/30ML; MG/30ML; MG/30ML
30 SUSPENSION ORAL ONCE
Status: COMPLETED | OUTPATIENT
Start: 2022-12-30 | End: 2022-12-30

## 2022-12-30 RX ORDER — FAMOTIDINE 20 MG/1
20 TABLET, FILM COATED ORAL 2 TIMES DAILY
Qty: 30 TABLET | Refills: 0 | Status: SHIPPED | OUTPATIENT
Start: 2022-12-30

## 2022-12-30 RX ADMIN — ALUMINUM HYDROXIDE, MAGNESIUM HYDROXIDE, AND SIMETHICONE 30 ML: 200; 200; 20 SUSPENSION ORAL at 10:26

## 2022-12-30 RX ADMIN — SODIUM CHLORIDE 1000 ML: 9 INJECTION, SOLUTION INTRAVENOUS at 10:27

## 2022-12-30 ASSESSMENT — ENCOUNTER SYMPTOMS
LIGHT-HEADEDNESS: 1
NAUSEA: 1
WOUND: 1
DIFFICULTY URINATING: 0
SHORTNESS OF BREATH: 0
PALPITATIONS: 0
VOMITING: 0

## 2022-12-30 ASSESSMENT — ACTIVITIES OF DAILY LIVING (ADL): ADLS_ACUITY_SCORE: 35

## 2022-12-30 NOTE — ED TRIAGE NOTES
Pt arrives after a fainting episode at home. Was standing in kitchen near stove and everything went black. Patient fell the floor. Per  once fell to floor woke up quickly. Pt hit cheek on stove. Small cut to right side of cheek. Bleeding controlled. Pt now complains of nausea, epigastric pain and dizziness.      Triage Assessment     Row Name 12/30/22 7974       Triage Assessment (Adult)    Airway WDL WDL       Respiratory WDL    Respiratory WDL WDL       Skin Circulation/Temperature WDL    Skin Circulation/Temperature WDL WDL       Cardiac WDL    Cardiac WDL WDL       Peripheral/Neurovascular WDL    Peripheral Neurovascular WDL WDL       Cognitive/Neuro/Behavioral WDL    Cognitive/Neuro/Behavioral WDL X  dizziness

## 2022-12-30 NOTE — DISCHARGE INSTRUCTIONS
Use maalox and/or pepcid as needed for pain if helps (pain may be due to gastritis, gerd, or ulcer). Consider daily prilosec after discussion with your primary care provider. Avoid ibuprofen/NDAIDs, alcohol, spicy foods.    Discharge Instructions  Syncope    Syncope (fainting) is a sudden, short loss of consciousness (passing out spell). People will usually fall to the ground when they faint or slump over if seated.  People may also shake when this happens, and it can sometimes be difficult to tell the difference between syncope and a seizure. At this time, your provider does not find a reason to suspect that your fainting spell is a sign of anything dangerous or life-threatening.  However, sometimes the signs of serious illness do not show up right away.     Generally, every Emergency Department visit should have a follow-up clinic visit with either a primary or a specialty clinic/provider. Please follow-up as instructed by your emergency provider today.    Return to the Emergency Department if:  You faint again.   You have any significant bleeding.  You have chest pain or a fast or irregular heartbeat.  You feel short of breath.  You cough up any blood.  You have abdominal (belly) pain or unusual back pain.  You have ongoing vomiting (throwing up) or diarrhea (loose stools).  You have a black or tarry bowel movement, or blood in the stool or in your vomit.  You have a fever over 101 F.  You lose feeling or cannot move a part of your body or cannot talk normally.  You are confused, have a headache, cannot see well, or have a seizure.  DO NOT DRIVE. CALL 911 INSTEAD!    What can I do to help myself?  Follow any specific instructions that your provider discussed with you.  If you feel light-headed, make sure to sit down right away, even if you have to sit on the floor.  Follow up with your regular medical provider as discussed for further management. This may include lowering your blood pressure medications, insulin  or other diabetic medications, checking your blood sugar more frequently, and drinking more fluids, taking medicines for vomiting or diarrhea or getting up slower.  If you were given a prescription for medicine here today, be sure to read all of the information (including the package insert) that comes with your prescription.  This will include important information about the medicine, its side effects, and any warnings that you need to know about.  The pharmacist who fills the prescription can provide more information and answer questions you may have about the medicine.  If you have questions or concerns that the pharmacist cannot address, please call or return to the Emergency Department.   Remember that you can always come back to the Emergency Department if you are not able to see your regular provider in the amount of time listed above, if you get any new symptoms, or if there is anything that worries you.     Discharge Instructions  Head Injury    You have been seen today for a head injury. Your evaluation included a history and physical examination. You may have had a CT (CAT) scan performed, though most head injuries do not require a scan. Based on this evaluation, your provider today does not feel that your head injury is serious.    Generally, every Emergency Department visit should have a follow-up clinic visit with either a primary or a specialty clinic/provider. Please follow-up as instructed by your emergency provider today.  Return to the Emergency Department if:  You are confused or you are not acting right.  Your headache gets worse or you start to have a really bad headache even with your recommended treatment plan.  You vomit (throw up) more than once.  You have a seizure.  You have trouble walking.  You have weakness or paralysis (cannot move) in an arm or a leg.  You have blood or fluid coming from your ears or nose.  You have new symptoms or anything that worries you.    Sleeping:  It is okay  for you to sleep, but someone should wake you up if instructed by your provider, and someone should check on you at your usual time to wake up.     Activity:  Do not drive for at least 24 hours.  Do not drive if you have dizzy spells or trouble concentrating, or remembering things.  Do not return to any contact sports until cleared by your regular provider.     MORE INFORMATION:    Concussion:  A concussion is a minor head injury that may cause temporary problems with the way the brain works. Although concussions are important, they are generally not an emergency or a reason that a person needs to be hospitalized. Some concussion symptoms include confusion, amnesia (forgetful), nausea (sick to your stomach) and vomiting (throwing up), dizziness, fatigue, memory or concentration problems, irritability and sleep problems. For most people, concussions are mild and temporary but some will have more severe and persistent symptoms that require on-going care and treatment.  CT Scans: Your evaluation today may have included a CT scan (CAT scan) to look for things like bleeding or a skull fracture (broken bone).  CT scans involve radiation and too many CT scans can cause serious health problems like cancer, especially in children.  Because of this, your provider may not have ordered a CT scan today if they think you are at low risk for a serious or life threatening problem.    If you were given a prescription for medicine here today, be sure to read all of the information (including the package insert) that comes with your prescription.  This will include important information about the medicine, its side effects, and any warnings that you need to know about.  The pharmacist who fills the prescription can provide more information and answer questions you may have about the medicine.  If you have questions or concerns that the pharmacist cannot address, please call or return to the Emergency Department.     Remember that you  can always come back to the Emergency Department if you are not able to see your regular provider in the amount of time listed above, if you get any new symptoms, or if there is anything that worries you.

## 2022-12-30 NOTE — ED PROVIDER NOTES
History   Chief Complaint:  Syncope     The history is provided by the patient and the spouse.      Rosy Carson is a 37 year old female who presents after an episode of syncope. The patient reports that this morning at about 0600 she had an episode of dizziness followed by syncope, where she fell to the floor and hit the left side of her cheek on the stove, causing a laceration. The patient is also complaining of abdominal pain which she has had for a week, which gets better when she has bowel movements, passes gas, and drinks water. She denies any palpitations, shortness of breath, chest pain, leg swelling, vomiting, and any urinary issues. She has been having regular periods.     Review of Systems   Respiratory: Negative for shortness of breath.    Cardiovascular: Negative for chest pain, palpitations and leg swelling.   Gastrointestinal: Positive for nausea. Negative for vomiting.   Genitourinary: Negative for difficulty urinating.   Skin: Positive for wound.   Neurological: Positive for syncope and light-headedness.   All other systems reviewed and are negative.      Allergies:  The patient has no known allergies.     Medications:  The patient is currently on no regular medications.     Past Medical History:     The patient denies past medical history.      Social History:  The patient presents to the ED with her .   PCP: No Ref-Primary, Physician     Physical Exam     Patient Vitals for the past 24 hrs:   BP Temp Temp src Pulse Resp SpO2 Weight   12/30/22 0647 112/76 97.9  F (36.6  C) Oral 77 20 100 % 63.5 kg (140 lb)     Physical Exam  General: Adult female sitting upright  Eyes: PERRL, Conjunctive within normal limits  ENT: Moist mucous membranes, oropharynx clear.   CV: Normal S1S2, no murmur, rub or gallop. Regular rate and rhythm  Resp: Clear to auscultation bilaterally, no wheezes, rales or rhonchi. Normal respiratory effort.  GI: Abdomen is soft and nondistended.  Mild tenderness in  epigastrium.  No palpable masses. No rebound or guarding.  MSK: No edema. Nontender. Normal active range of motion.  Skin: Warm and dry.  Tiny superficial well aligned linear laceration over the left cheek.  No rashes or lesions or ecchymoses on visible skin.  Neuro: Alert and oriented. Responds appropriately to all questions and commands. No focal findings appreciated. Normal muscle tone.  Psych: Normal mood and affect. Pleasant.    Emergency Department Course   ECG  ECG results from 12/30/22   EKG 12-lead, tracing only     Value    Systolic Blood Pressure     Diastolic Blood Pressure     Ventricular Rate 87    Atrial Rate 87    MD Interval 164    QRS Duration 86        QTc 454    P Axis 64    R AXIS 47    T Axis -11    Interpretation ECG      Sinus rhythm  T wave abnormality, consider inferior ischemia  Abnormal ECG  No previous ECGs available       Laboratory:  Labs Ordered and Resulted from Time of ED Arrival to Time of ED Departure   COMPREHENSIVE METABOLIC PANEL - Abnormal       Result Value    Sodium 139      Potassium 3.6      Chloride 104      Carbon Dioxide (CO2) 23      Anion Gap 12      Urea Nitrogen 13.4      Creatinine 0.47 (*)     Calcium 9.1      Glucose 95      Alkaline Phosphatase 84      AST 16      ALT 9 (*)     Protein Total 7.9      Albumin 4.3      Bilirubin Total 0.2      GFR Estimate >90     CBC WITH PLATELETS AND DIFFERENTIAL - Abnormal    WBC Count 5.0      RBC Count 4.69      Hemoglobin 10.9 (*)     Hematocrit 35.8      MCV 76 (*)     MCH 23.2 (*)     MCHC 30.4 (*)     RDW 17.4 (*)     Platelet Count 298      % Neutrophils 60      % Lymphocytes 29      % Monocytes 8      % Eosinophils 2      % Basophils 1      % Immature Granulocytes 0      NRBCs per 100 WBC 0      Absolute Neutrophils 3.0      Absolute Lymphocytes 1.4      Absolute Monocytes 0.4      Absolute Eosinophils 0.1      Absolute Basophils 0.0      Absolute Immature Granulocytes 0.0      Absolute NRBCs 0.0     HCG  QUALITATIVE PREGNANCY - Normal    hCG Serum Qualitative Negative     LIPASE - Normal    Lipase 25     TROPONIN T, HIGH SENSITIVITY - Normal    Troponin T, High Sensitivity <6        Emergency Department Course:     Reviewed:  I reviewed nursing notes, vitals, past medical history and Care Everywhere    Assessments:  1001 I obtained history and examined the patient as noted above.   1059 I rechecked the patient, she stated that her abdominal pain was improved after the Maalox treatment, so we discussed her discharge to home with Pepcid.     Interventions:  1026 Maalox 30 mL oral   1027 0.9% sodium chloride BOLUS 1L IV     Disposition:  The patient was discharged to home.     Impression & Plan   Medical Decision Making:  Rosy Carson is a 37 year old female who presents with a concern for an episode of syncope preceded by dizziness.  While vasovagal or orthostatic syncope was the most likely etiology given the history of this patient, I considered a broad differential for their syncope today including cardiac arrythmia, ACS, aortic stenosis, HOCM, PE, orthostatic hypotension, drugs, situational, carotid hypersensitivity, seizure, TIA, stroke, vasovagal.   She has no signs of a concerning etiology for syncope at this point.  In addition,she has no family history of sudden death, no chest pain, no seizure activity or post-ictal period, no murmur, and no signs of orthostasis in the ED, no focal neurologic symptoms, and no complaints of concerning headache.  The patient notes that she is also been having epigastric pain over the past week, sounding GI in origin.  No evidence clinically to suggest pancreatitis or gallbladder involvement.  ECG was unremarkable and I do not suspect ACS as a cause for her symptoms over the past week or related to her syncope today.  She had no palpitations reported and no evidence of dysrhythmia on ECG.  She has a tiny laceration does not require suturing.  Appropriate wound care  discussed.  Overall, the workup in the ED is negative and the physical exam is re-assurring.  Supportive outpatient management is therefore indicated.   She felt comfortable's plan.  She understands importance of close follow-up and return if symptoms worsen.  Recommended follow-up within 3-5 days with her primary care provider    Diagnosis:    ICD-10-CM    1. Syncope, unspecified syncope type  R55       2. Abdominal pain, epigastric  R10.13       3. Facial laceration, initial encounter  S01.81XA       4. Facial contusion, initial encounter  S00.83XA       5. Anemia, unspecified type  D64.9         Discharge Medications:  New Prescriptions    FAMOTIDINE (PEPCID) 20 MG TABLET    Take 1 tablet (20 mg) by mouth 2 times daily     Scribe Disclosure:  I, Juwan Posadas, am serving as a scribe at 9:55 AM on 12/30/2022 to document services personally performed by Radha Phipps MD based on my observations and the provider's statements to me.          Radha Phipps MD  12/31/22 [id number]

## 2023-01-02 LAB
ATRIAL RATE - MUSE: 87 BPM
DIASTOLIC BLOOD PRESSURE - MUSE: NORMAL MMHG
INTERPRETATION ECG - MUSE: NORMAL
P AXIS - MUSE: 64 DEGREES
PR INTERVAL - MUSE: 164 MS
QRS DURATION - MUSE: 86 MS
QT - MUSE: 378 MS
QTC - MUSE: 454 MS
R AXIS - MUSE: 47 DEGREES
SYSTOLIC BLOOD PRESSURE - MUSE: NORMAL MMHG
T AXIS - MUSE: -11 DEGREES
VENTRICULAR RATE- MUSE: 87 BPM